# Patient Record
(demographics unavailable — no encounter records)

---

## 2024-10-31 NOTE — PLAN
[FreeTextEntry1] : Plan: Pt does not meet eligibility criteria for lung cancer screening.   MILDRED MEJIA does not fit the criteria for lung cancer screening. She last smoked cigarettes 32 years ago   Qualifications are: Age 50-80 years old (50-77 years old for Medicare) Current or former cigarette smoker of 20 or more pack years. Quit less than 15 years ago. No symptoms of lung cancer. No history of lung cancer within the last 5 years.    Should I screen? tool utilized. 6 Year risk of lung cancer is   1.1%.   Pt advised to discuss alternative testing with PCP and or pulmonologist evaluation.   Engaged in shared decision making with Ms. MEJIA . Answered all questions. She verbalized understanding and agreement. She knows to call back with and questions or concerns.

## 2024-10-31 NOTE — HISTORY OF PRESENT ILLNESS
[Former] : Former [TextBox_13] : Referred by Dr. Jenna Mckeon  MILDRED MEJIA had telephonic visit for a review of eligibility and discussion of the Low dose CT lung cancer screening program. A telephonic visit occurred due to the patient not having access to a smart phone or a computer for an audio/visual visit. The following was reviewed to determine if patient meets eligibility criteria. -Age 68 year Smoking Status: -Former smoker Started smoking at  15  years old. Smoked 1 PPD for 21 years.  -Number of pack years smokin -Number of years since quitting smokin -Quit year:   Ms. MEJIA denies any signs or symptoms of lung cancer including new cough, changing cough, hemoptysis, and unintentional weight loss.  Ms. MEJIA denies HX of lung disease, heart disease, connective tissue disease, and any personal history of cancer. Reports lung cancer in a 1st degree relative: Mother. Reports lung cancer in a 2nd degree relative: Maternal aunt. Also 1st cousin HX lung cancer. Had heavy childhood exposure to 2nd hand smoke.  [YearQuit] : 1992 [PacksperYear] : 21